# Patient Record
Sex: MALE | Race: OTHER
[De-identification: names, ages, dates, MRNs, and addresses within clinical notes are randomized per-mention and may not be internally consistent; named-entity substitution may affect disease eponyms.]

---

## 2020-02-21 ENCOUNTER — HOSPITAL ENCOUNTER (EMERGENCY)
Dept: HOSPITAL 72 - EMR | Age: 40
Discharge: LEFT BEFORE DISCHARGE | End: 2020-02-21
Payer: COMMERCIAL

## 2020-02-21 VITALS — SYSTOLIC BLOOD PRESSURE: 119 MMHG | DIASTOLIC BLOOD PRESSURE: 72 MMHG

## 2020-02-21 VITALS — DIASTOLIC BLOOD PRESSURE: 72 MMHG | SYSTOLIC BLOOD PRESSURE: 119 MMHG

## 2020-02-21 VITALS — BODY MASS INDEX: 23.7 KG/M2 | WEIGHT: 175 LBS | HEIGHT: 72 IN

## 2020-02-21 DIAGNOSIS — R20.2: Primary | ICD-10-CM

## 2020-02-21 LAB
APPEARANCE UR: CLEAR
APTT PPP: (no result) S
GLUCOSE UR STRIP-MCNC: NEGATIVE MG/DL
KETONES UR QL STRIP: NEGATIVE
LEUKOCYTE ESTERASE UR QL STRIP: NEGATIVE
NITRITE UR QL STRIP: NEGATIVE
PH UR STRIP: 6 [PH] (ref 4.5–8)
PROT UR QL STRIP: NEGATIVE
SP GR UR STRIP: 1.01 (ref 1–1.03)
UROBILINOGEN UR-MCNC: NORMAL MG/DL (ref 0–1)

## 2020-02-21 PROCEDURE — 99283 EMERGENCY DEPT VISIT LOW MDM: CPT

## 2020-02-21 PROCEDURE — 81003 URINALYSIS AUTO W/O SCOPE: CPT

## 2020-02-21 PROCEDURE — 96372 THER/PROPH/DIAG INJ SC/IM: CPT

## 2020-02-21 NOTE — EMERGENCY ROOM REPORT
History of Present Illness


General


Chief Complaint:  Male Urogenital Problems


Source:  Patient





Present Illness


HPI


39-year-old male with no significant past medical history here complaining of 

tingling sensation on penile gland x2 days after being sexually active.  Unsure 

whether he was exposed to sexually transmitted disease.  Denies any penile 

discharge and urinary frequency and urgency.  Denies any past history of 

sexually transmitted diseases.  Wants to be tested.  Patient first agrees to be 

prophylactically treated for chlamydia, gonorrhea, syphilis, and get a 

prescription for treatment for possible herpes virus and go to STD clinics for 

further evaluation.  Patient then refuses to get treated and signed AGAINST 

MEDICAL ADVICE.  Patient also complains of several months of left testicular 

pain that is intermittent.  Denies any testicular pain or swelling at this 

time.  Has not yet been seen by urologist


Allergies:  


Coded Allergies:  


     No Known Allergies (Unverified , 2/21/20)





Patient History


Past Medical History:  see triage record


Past Surgical History:  none


Pertinent Family History:  none


Immunizations:  UTD


Reviewed Nursing Documentation:  PMH: Agreed; PSxH: Agreed





Nursing Documentation-PMH


Past Medical History:  No Stated History





Review of Systems


All Other Systems:  negative except mentioned in HPI





Physical Exam





Vital Signs








  Date Time  Temp Pulse Resp B/P (MAP) Pulse Ox O2 Delivery O2 Flow Rate FiO2


 


2/21/20 18:25 98.2 66 17 119/72 (88) 99 Room Air  








Sp02 EP Interpretation:  reviewed, normal


General Appearance:  no apparent distress, alert, GCS 15, non-toxic


Head:  normocephalic, atraumatic


Eyes:  bilateral eye normal inspection, bilateral eye PERRL


ENT:  hearing grossly normal, normal pharynx, no angioedema, normal voice


Neck:  full range of motion, supple/symm/no masses


Respiratory:  chest non-tender, lungs clear, normal breath sounds, no rhonchi, 

speaking full sentences


Cardiovascular #1:  regular rate, rhythm, no edema


Gastrointestinal:  soft


Rectal:  deferred


Genitourinary:  no CVA tenderness


Musculoskeletal:  back normal, normal range of motion, gait/station normal, non-

tender


Neurologic:  alert, motor strength/tone normal, oriented x3, sensory intact, 

responsive, speech normal


Psychiatric:  normal inspection, judgement/insight normal, memory normal


Skin:  no rash


Lymphatic:  no adenopathy





Medical Decision Making


PA Attestation


All diagnoses and treatment plans were reviewed and discussed with my 

supervising physician Dr. Govea


Diagnostic Impression:  


 Primary Impression:  


 Left against medical advice


ER Course


39-year-old male with no significant past medical history here complaining of 

tingling sensation on penile gland x2 days after being sexually active.  Unsure 

whether he was exposed to sexually transmitted disease.  Denies any penile 

discharge and urinary frequency and urgency.  Denies any past history of 

sexually transmitted diseases.  Wants to be tested.  Patient first agrees to be 

prophylactically treated for chlamydia, gonorrhea, syphilis, and get a 

prescription for treatment for possible herpes virus and go to STD clinics for 

further evaluation.  Patient then refuses to get treated and signed AGAINST 

MEDICAL ADVICE.  Patient also complains of several months of left testicular 

pain that is intermittent.  Denies any testicular pain or swelling at this 

time.  Has not yet been seen by urologist





Ddx considered but are not limited to: UTI, chlamydia, Gohnorrea, syphylis, HIV

, herpes 1 or 2














Vital signs: are WNL, pt. is afebrile








 H&PE are most consistent with : Prophylactic treatment for STD














ORDERS: UA, urince cx, 








ED INTERVENTIONS: None required at this time.




















Patient refused treatment and left AGAINST MEDICAL ADVICE.  Did not wait for 

urine sample results.  Patient will follow with primary care provider tomorrow.

  Also advised patient to follow with urologist and no ultrasound of the 

testicles needed at this time as this is nonacute and patient sitting 

comfortably in no pain and no swelling





Last Vital Signs








  Date Time  Temp Pulse Resp B/P (MAP) Pulse Ox O2 Delivery O2 Flow Rate FiO2


 


2/21/20 19:40 98.2 75 17 119/72 99 Room Air  








Disposition:  AGAINST MEDICAL ADVICE


Condition:  Stable











Bianca Philip Feb 21, 2020 22:13

## 2020-05-18 ENCOUNTER — HOSPITAL ENCOUNTER (EMERGENCY)
Dept: HOSPITAL 72 - EMR | Age: 40
LOS: 1 days | Discharge: HOME | End: 2020-05-19
Payer: MEDICAID

## 2020-05-18 VITALS — WEIGHT: 175 LBS | BODY MASS INDEX: 23.7 KG/M2 | HEIGHT: 72 IN

## 2020-05-18 VITALS — SYSTOLIC BLOOD PRESSURE: 124 MMHG | DIASTOLIC BLOOD PRESSURE: 76 MMHG

## 2020-05-18 DIAGNOSIS — M54.2: ICD-10-CM

## 2020-05-18 DIAGNOSIS — R07.9: Primary | ICD-10-CM

## 2020-05-18 DIAGNOSIS — M54.6: ICD-10-CM

## 2020-05-18 DIAGNOSIS — M25.519: ICD-10-CM

## 2020-05-18 DIAGNOSIS — R06.02: ICD-10-CM

## 2020-05-18 PROCEDURE — 93005 ELECTROCARDIOGRAM TRACING: CPT

## 2020-05-18 PROCEDURE — 81003 URINALYSIS AUTO W/O SCOPE: CPT

## 2020-05-18 PROCEDURE — 84484 ASSAY OF TROPONIN QUANT: CPT

## 2020-05-18 PROCEDURE — 36415 COLL VENOUS BLD VENIPUNCTURE: CPT

## 2020-05-18 PROCEDURE — 85025 COMPLETE CBC W/AUTO DIFF WBC: CPT

## 2020-05-18 PROCEDURE — 99283 EMERGENCY DEPT VISIT LOW MDM: CPT

## 2020-05-18 PROCEDURE — 71045 X-RAY EXAM CHEST 1 VIEW: CPT

## 2020-05-18 PROCEDURE — 80053 COMPREHEN METABOLIC PANEL: CPT

## 2020-05-18 NOTE — EMERGENCY ROOM REPORT
History of Present Illness


General


Chief Complaint:  Chest Pain


Source:  Patient





Present Illness


HPI


This a 39-year-old male with no past medical history.  He presents with chief 

complaint of chest pain.  Pain is across his chest into his upper back neck and 

shoulder area.  Pain is a tightness.  Complains of mild shortness of breath.  

No fever chills but no nausea or vomiting.  Is been constant all day.  Better 

now.  At maximum it was a 7 out of 10.  Now is about a 2 out of 10.  Did not 

take any thing for it.  No exertional component.  No diaphoresis.  No shortness 

of breath.  Nothing made it better.  Nothing made it worse.


Allergies:  


Coded Allergies:  


     No Known Allergies (Unverified , 2/21/20)





COVID-19 Screening


Contact w/high risk pt:  No


Recent Travel to affected area:  No


Experienced COVID-19 symptoms?:  No


COVID-19 Testing performed PTA:  No





Patient History


Past Medical History:  none, see triage record, old chart reviewed


Past Surgical History:  none


Pertinent Family History:  none


Social History:  Denies: smoking


Immunizations:  other


Reviewed Nursing Documentation:  PMH: Agreed; PSxH: Agreed





Review of Systems


Eye:  Denies: eye pain, blurred vision


ENT:  Denies: ear pain, nose congestion, throat swelling


Respiratory:  Denies: cough, shortness of breath


Cardiovascular:  Reports: chest pain; Denies: palpitations


Gastrointestinal:  Denies: abdominal pain, diarrhea, nausea, vomiting


Musculoskeletal:  Denies: back pain, joint pain


Skin:  Denies: rash


Neurological:  Denies: headache, numbness


Endocrine:  Denies: increased thirst, increased urine


Hematologic/Lymphatic:  Denies: easy bruising


All Other Systems:  negative except mentioned in HPI





Physical Exam





Vital Signs








  Date Time  Temp Pulse Resp B/P (MAP) Pulse Ox O2 Delivery O2 Flow Rate FiO2


 


5/18/20 23:39 98.2 67 16 124/76 (92) 96 Room Air  





Vitals normal


Sp02 EP Interpretation:  reviewed, normal


General Appearance:  well appearing, no apparent distress, alert


Head:  normocephalic, atraumatic


Eyes:  bilateral eye PERRL, bilateral eye EOMI


ENT:  hearing grossly normal, normal pharynx


Neck:  full range of motion, supple, no meningismus


Respiratory:  chest non-tender, lungs clear, normal breath sounds


Cardiovascular #1:  regular rate, rhythm, no murmur


Gastrointestinal:  normal bowel sounds, non tender, no mass, no organomegaly, 

no bruit, non-distended


Musculoskeletal:  back normal, normal range of motion, gait/station normal


Psychiatric:  mood/affect normal





Medical Decision Making


Diagnostic Impression:  


 Primary Impression:  


 Chest pain


 Qualified Codes:  R07.9 - Chest pain, unspecified


ER Course


Patient presents with atypical chest pain.  Pain been constant and troponin is 

negative.  EKG is normal.  Patient is an active runner.  No evidence of ACS, PE

, dissection to name a few.  This most likely musculoskeletal in nature.  Could 

also be GI related.  Will discharge home.


EKG Diagnostic Results


Rate:  normal


Rhythm:  NSR


ST Segments:  no acute changes


ASA given to the pt in ED:  Yes





Rhythm Strip Diag. Results


EP Interpretation:  yes


Rate:  60


Rhythm:  NSR, no PVC's





Chest X-Ray Diagnostic Results


Chest X-Ray Diagnostic Results :  


   Chest X-Ray Ordered:  Yes


   # of Views/Limited/Complete:  1 View


   Indication:  Chest Pain


   EP Interpretation:  Yes


   Interpretation:  no consolidation, no effusion, no pneumothorax, no acute 

cardiopulmonary disease


   Impression:  No acute disease


   Electronically Signed by:  Ray Prasad MD





Last Vital Signs








  Date Time  Temp Pulse Resp B/P (MAP) Pulse Ox O2 Delivery O2 Flow Rate FiO2


 


5/18/20 23:39 98.2 67 16 124/76 (92) 96 Room Air  








Status:  improved


Disposition:  HOME, SELF-CARE


Condition:  Stable


Patient Instructions:  Nonspecific Chest Pain





Additional Instructions:  


Follow-up with your doctor in 7 days.  Return if symptoms worsen.











Ray Prasad MD May 18, 2020 23:57

## 2020-05-18 NOTE — NUR
CPED Nurse Note:

Pt ambulated to ED from home c/o CP and pressure radiating to R arm, also a 
"lump" when he swallows food. Pt reports getting "winded" when on his usual 
run. VSS. Pt is A&Ox4. PT placed on cardiac monitor. ERMD at bedside

## 2020-05-19 VITALS — SYSTOLIC BLOOD PRESSURE: 124 MMHG | DIASTOLIC BLOOD PRESSURE: 76 MMHG

## 2020-05-19 LAB
ADD MANUAL DIFF: NO
ALBUMIN SERPL-MCNC: 4.4 G/DL (ref 3.4–5)
ALBUMIN/GLOB SERPL: 1.6 {RATIO} (ref 1–2.7)
ALP SERPL-CCNC: 69 U/L (ref 46–116)
ALT SERPL-CCNC: 47 U/L (ref 12–78)
ANION GAP SERPL CALC-SCNC: 7 MMOL/L (ref 5–15)
APPEARANCE UR: CLEAR
APTT PPP: YELLOW S
AST SERPL-CCNC: 21 U/L (ref 15–37)
BASOPHILS NFR BLD AUTO: 1.1 % (ref 0–2)
BILIRUB SERPL-MCNC: 0.5 MG/DL (ref 0.2–1)
BUN SERPL-MCNC: 19 MG/DL (ref 7–18)
CALCIUM SERPL-MCNC: 9.9 MG/DL (ref 8.5–10.1)
CHLORIDE SERPL-SCNC: 104 MMOL/L (ref 98–107)
CO2 SERPL-SCNC: 30 MMOL/L (ref 21–32)
CREAT SERPL-MCNC: 1.1 MG/DL (ref 0.55–1.3)
EOSINOPHIL NFR BLD AUTO: 1.9 % (ref 0–3)
ERYTHROCYTE [DISTWIDTH] IN BLOOD BY AUTOMATED COUNT: 10.7 % (ref 11.6–14.8)
GLOBULIN SER-MCNC: 2.8 G/DL
GLUCOSE UR STRIP-MCNC: NEGATIVE MG/DL
HCT VFR BLD CALC: 44.7 % (ref 42–52)
HGB BLD-MCNC: 16.2 G/DL (ref 14.2–18)
KETONES UR QL STRIP: NEGATIVE
LEUKOCYTE ESTERASE UR QL STRIP: NEGATIVE
LYMPHOCYTES NFR BLD AUTO: 37.1 % (ref 20–45)
MCV RBC AUTO: 84 FL (ref 80–99)
MONOCYTES NFR BLD AUTO: 9.3 % (ref 1–10)
NEUTROPHILS NFR BLD AUTO: 50.6 % (ref 45–75)
NITRITE UR QL STRIP: NEGATIVE
PH UR STRIP: 6 [PH] (ref 4.5–8)
PLATELET # BLD: 217 K/UL (ref 150–450)
POTASSIUM SERPL-SCNC: 4 MMOL/L (ref 3.5–5.1)
PROT UR QL STRIP: NEGATIVE
RBC # BLD AUTO: 5.32 M/UL (ref 4.7–6.1)
SODIUM SERPL-SCNC: 141 MMOL/L (ref 136–145)
SP GR UR STRIP: 1.02 (ref 1–1.03)
UROBILINOGEN UR-MCNC: NORMAL MG/DL (ref 0–1)
WBC # BLD AUTO: 7.5 K/UL (ref 4.8–10.8)

## 2020-05-19 NOTE — DIAGNOSTIC IMAGING REPORT
EXAM:

  XR Chest, 1 View

 

CLINICAL HISTORY:

  CP

 

TECHNIQUE:

  Frontal view of the chest.

 

COMPARISON:

  No relevant prior studies available.

 

FINDINGS:

  Lungs:  No significant abnormality.  No consolidation.

  Pleural space:  No significant abnormality.  No pneumothorax.

  Heart:  No significant abnormality.  No cardiomegaly.

  Mediastinum:  No significant abnormality.

  Bones/joints:  No acute osseous abnormality.

 

IMPRESSION:     

  No acute cardiopulmonary process.